# Patient Record
Sex: FEMALE | Race: WHITE | NOT HISPANIC OR LATINO | URBAN - NONMETROPOLITAN AREA
[De-identification: names, ages, dates, MRNs, and addresses within clinical notes are randomized per-mention and may not be internally consistent; named-entity substitution may affect disease eponyms.]

---

## 2018-12-07 RX ORDER — DICYCLOMINE HYDROCHLORIDE 10 MG/1
10 CAPSULE ORAL
COMMUNITY

## 2018-12-07 RX ORDER — OMEPRAZOLE 20 MG/1
20 CAPSULE, DELAYED RELEASE ORAL DAILY
COMMUNITY

## 2018-12-07 RX ORDER — PROCHLORPERAZINE MALEATE 10 MG
10 TABLET ORAL EVERY 6 HOURS PRN
COMMUNITY

## 2018-12-07 RX ORDER — NAPROXEN 500 MG/1
500 TABLET ORAL 2 TIMES DAILY WITH MEALS
COMMUNITY

## 2025-05-12 ENCOUNTER — OFFICE VISIT (OUTPATIENT)
Dept: CARDIOLOGY | Facility: CLINIC | Age: 40
End: 2025-05-12
Payer: COMMERCIAL

## 2025-05-12 VITALS
OXYGEN SATURATION: 95 % | SYSTOLIC BLOOD PRESSURE: 113 MMHG | HEART RATE: 79 BPM | DIASTOLIC BLOOD PRESSURE: 79 MMHG | BODY MASS INDEX: 32.27 KG/M2 | WEIGHT: 200.8 LBS | HEIGHT: 66 IN

## 2025-05-12 DIAGNOSIS — R53.82 CHRONIC FATIGUE: ICD-10-CM

## 2025-05-12 DIAGNOSIS — R06.83 SNORING: ICD-10-CM

## 2025-05-12 DIAGNOSIS — R06.02 SOB (SHORTNESS OF BREATH): ICD-10-CM

## 2025-05-12 DIAGNOSIS — G47.10 HYPERSOMNIA: ICD-10-CM

## 2025-05-12 DIAGNOSIS — R00.2 PALPITATION: ICD-10-CM

## 2025-05-12 DIAGNOSIS — G47.9 SLEEPING DIFFICULTIES: ICD-10-CM

## 2025-05-12 DIAGNOSIS — I10 PRIMARY HYPERTENSION: ICD-10-CM

## 2025-05-12 DIAGNOSIS — R07.2 PRECORDIAL PAIN: Primary | ICD-10-CM

## 2025-05-12 PROBLEM — K58.9 IBS (IRRITABLE BOWEL SYNDROME): Status: ACTIVE | Noted: 2023-06-10
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
## 2025-05-12 PROBLEM — K21.9 GERD (GASTROESOPHAGEAL REFLUX DISEASE): Status: ACTIVE | Noted: 2023-06-10

## 2025-05-12 PROCEDURE — 3074F SYST BP LT 130 MM HG: CPT | Performed by: INTERNAL MEDICINE

## 2025-05-12 PROCEDURE — 99204 OFFICE O/P NEW MOD 45 MIN: CPT | Performed by: INTERNAL MEDICINE

## 2025-05-12 PROCEDURE — 3078F DIAST BP <80 MM HG: CPT | Performed by: INTERNAL MEDICINE

## 2025-05-12 RX ORDER — MECOBALAMIN 5000 MCG
15 TABLET,DISINTEGRATING ORAL DAILY
COMMUNITY
Start: 2025-04-25

## 2025-05-12 RX ORDER — ATENOLOL 25 MG/1
25 TABLET ORAL DAILY
COMMUNITY
Start: 2025-05-10

## 2025-05-12 RX ORDER — HYDROCHLOROTHIAZIDE 12.5 MG/1
12.5 TABLET ORAL EVERY MORNING
COMMUNITY

## 2025-05-12 RX ORDER — AMITRIPTYLINE HYDROCHLORIDE 75 MG/1
75 TABLET ORAL NIGHTLY
COMMUNITY
Start: 2025-01-06

## 2025-05-12 RX ORDER — DULOXETIN HYDROCHLORIDE 60 MG/1
60 CAPSULE, DELAYED RELEASE ORAL DAILY
COMMUNITY
Start: 2025-04-25

## 2025-05-12 RX ORDER — PREGABALIN 75 MG/1
75 CAPSULE ORAL 2 TIMES DAILY
COMMUNITY
Start: 2025-03-07 | End: 2025-06-05
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
## 2025-05-21 ENCOUNTER — TELEPHONE (OUTPATIENT)
Dept: CARDIOLOGY | Facility: CLINIC | Age: 40
End: 2025-05-21
Payer: COMMERCIAL

## 2025-05-21 NOTE — TELEPHONE ENCOUNTER
Patient called in stating that she has broke out with the monitor and has had off almost a week. She is coming by to get 455 sensitive strips and I s/w Myra @ [facility] she is extending the monitor wear time 7 more days.Patient will wear monitor now til June2.

## 2025-05-29 ENCOUNTER — TELEPHONE (OUTPATIENT)
Dept: CARDIOLOGY | Facility: CLINIC | Age: 40
End: 2025-05-29

## 2025-05-29 NOTE — TELEPHONE ENCOUNTER
Caller: Kelsea Quiñonez    Relationship: Self    Best call back number: [phone]     What is the best time to reach you: ANYTIME    Who are you requesting to speak with (clinical staff, provider,  specific staff member): PROVIDER    Do you know the name of the person who called: NA    What was the call regarding: PT REPORTS SHE CAN NO LONGER WEAR HOLTER, EVEN THE SENSITIVE STRIPS BLISTERED HER, SO SHE IS GOING TO MAIL THAT OUT TODAY. PT REPORTS TAKING THIS HOLTER OFF ON 5.26.25 DUE TO BLISTERING,BUT THINKS SHE GOT CLOSE TO THE 14 DAY PERIOD.    Is it okay if the provider responds through [employer]: NO

## 2025-06-10 ENCOUNTER — RESULTS FOLLOW-UP (OUTPATIENT)
Dept: CARDIOLOGY | Facility: CLINIC | Age: 40
End: 2025-06-10
Payer: COMMERCIAL

## 2025-06-10 NOTE — TELEPHONE ENCOUNTER
Patient notified of heart monitor results.    Hang Fernando MD to [facility]    6/9/25 12:53 PM  Keep follow-up appointment as scheduled  Cardiac Event Monitor (NANETTE) or Mobile Cardiac Outpatient Telemetry (MCT)

## 2025-06-10 NOTE — TELEPHONE ENCOUNTER
----- Message from Conchita TAYLOR sent at 6/9/2025  8:17 PM EDT -----    ----- Message -----  From: Hang Fernando MD  Sent: 6/9/2025  12:53 PM EDT  To: CORI Calabrese    Keep follow-up appointment as scheduled  ----- Message -----  From: Hang Fernando MD  Sent: 6/3/2025   8:23 PM EDT  To: Hang Fernando MD

## 2025-07-28 ENCOUNTER — TELEPHONE (OUTPATIENT)
Dept: CARDIOLOGY | Facility: CLINIC | Age: 40
End: 2025-07-28
Payer: COMMERCIAL

## 2025-07-28 NOTE — TELEPHONE ENCOUNTER
My chart msg. Sent to patient regarding [facility] test results:     Your stress test and echo results were reviewed by Dr. Fernando. The stress test did not indicate any narrowing or blockage. The echo indicated nl. Heart pump function and no significant valve problems. Please keep your f/u appt. As scheduled. Thank You.